# Patient Record
Sex: MALE | Race: WHITE | NOT HISPANIC OR LATINO | URBAN - NONMETROPOLITAN AREA
[De-identification: names, ages, dates, MRNs, and addresses within clinical notes are randomized per-mention and may not be internally consistent; named-entity substitution may affect disease eponyms.]

---

## 2021-03-23 ENCOUNTER — OFFICE VISIT (OUTPATIENT)
Dept: URBAN - NONMETROPOLITAN AREA CLINIC 2 | Facility: CLINIC | Age: 76
End: 2021-03-23
Payer: MEDICARE

## 2021-03-23 VITALS
WEIGHT: 235 LBS | BODY MASS INDEX: 32.9 KG/M2 | HEIGHT: 71 IN | SYSTOLIC BLOOD PRESSURE: 148 MMHG | DIASTOLIC BLOOD PRESSURE: 74 MMHG | HEART RATE: 45 BPM

## 2021-03-23 DIAGNOSIS — K92.1 MELENA: ICD-10-CM

## 2021-03-23 DIAGNOSIS — K57.90 DIVERTICULOSIS: ICD-10-CM

## 2021-03-23 DIAGNOSIS — Z86.010 PERSONAL HISTORY OF COLONIC POLYPS: ICD-10-CM

## 2021-03-23 DIAGNOSIS — R10.13 DYSPEPSIA: ICD-10-CM

## 2021-03-23 PROCEDURE — 99204 OFFICE O/P NEW MOD 45 MIN: CPT | Performed by: INTERNAL MEDICINE

## 2021-03-23 RX ORDER — TAMSULOSIN HYDROCHLORIDE 0.4 MG/1
1 CAPSULE CAPSULE ORAL ONCE A DAY
Status: ACTIVE | COMMUNITY

## 2021-03-23 RX ORDER — SODIUM, POTASSIUM,MAG SULFATES 17.5-3.13G
354 ML SOLUTION, RECONSTITUTED, ORAL ORAL ONCE
Qty: 354 MILLILITER | Refills: 0 | OUTPATIENT
Start: 2021-03-23 | End: 2021-03-24

## 2021-03-23 RX ORDER — MONTELUKAST SODIUM 10 MG/1
1 TABLET TABLET, FILM COATED ORAL ONCE A DAY
Status: ACTIVE | COMMUNITY

## 2021-03-23 RX ORDER — NAPROXEN 500 MG/1
1 TABLET WITH FOOD OR MILK AS NEEDED TABLET ORAL
Status: ACTIVE | COMMUNITY

## 2021-03-23 RX ORDER — GABAPENTIN 300 MG/1
1 CAPSULE CAPSULE ORAL ONCE A DAY
Status: ACTIVE | COMMUNITY

## 2021-03-23 RX ORDER — AMLODIPINE BESYLATE 10 MG/1
1 TABLET TABLET ORAL ONCE A DAY
Status: ACTIVE | COMMUNITY

## 2021-03-23 RX ORDER — ROSUVASTATIN CALCIUM 5 MG/1
1 TABLET TABLET, FILM COATED ORAL ONCE A DAY
Status: ACTIVE | COMMUNITY

## 2021-03-23 RX ORDER — LATANOPROST 50 UG/ML
1 DROP INTO AFFECTED EYE IN THE EVENING SOLUTION/ DROPS OPHTHALMIC ONCE A DAY
Status: ACTIVE | COMMUNITY

## 2021-03-23 RX ORDER — LOSARTAN POTASSIUM 25 MG/1
1 TABLET TABLET ORAL ONCE A DAY
Status: ACTIVE | COMMUNITY

## 2021-03-23 NOTE — HPI-TODAY'S VISIT:
75 year old gentleman with history of vocal cord cancer, colon polyps, hypertension, arthritis, former tobacco use (quit in 2004). diverticulosis presenting for evaluation of adenoma surveillance.   Mr. Fritz notes that his last colonoscopy was in 2014. He was noted to have 8 polyps during his first colonoscopy,   5 polyps during his second colonoscopy, and 3 polyps during his colonosocpyin 2014.  He experiences constipation at times. He notes indigestion. At times he can have dark stools. He does use naproxen 500 mg PO BID. He is not currently on a PPI.  Denies nausea, vomiting, abdominal pain.  He was previously an  for [employer].

## 2021-03-23 NOTE — PHYSICAL EXAM GASTROINTESTINAL
Abdomen , Central adiposity appreciated, soft , nontender, nondistended , no guarding or rigidity , no masses palpable , normal bowel sounds , Liver and Spleen , no hepatomegaly present , no hepatosplenomegaly , liver nontender , spleen not palpable

## 2021-03-28 LAB
A/G RATIO: 2
ALBUMIN: 5.1
ALKALINE PHOSPHATASE: 69
ALT (SGPT): 15
AST (SGOT): 23
BASO (ABSOLUTE): 0.1
BASOS: 1
BILIRUBIN, TOTAL: 0.7
BUN/CREATININE RATIO: 23
BUN: 24
C-REACTIVE PROTEIN, QUANT: 2
CALCIUM: 9.7
CARBON DIOXIDE, TOTAL: 30
CHLORIDE: 99
CREATININE: 1.03
EGFR IF AFRICN AM: 83
EGFR IF NONAFRICN AM: 72
ENDOMYSIAL ANTIBODY IGA: NEGATIVE
EOS (ABSOLUTE): 0.2
EOS: 3
GLOBULIN, TOTAL: 2.5
GLUCOSE: 93
H PYLORI, IGM ABS: <9
H. PYLORI, IGA ABS: <9
H. PYLORI, IGG ABS: 0.15
HEMATOCRIT: 45.9
HEMATOLOGY COMMENTS:: (no result)
HEMOGLOBIN: 14.8
IMMATURE CELLS: (no result)
IMMATURE GRANS (ABS): 0
IMMATURE GRANULOCYTES: 0
IMMUNOGLOBULIN A, QN, SERUM: 239
LYMPHS (ABSOLUTE): 2.1
LYMPHS: 35
MCH: 28.9
MCHC: 32.2
MCV: 90
MONOCYTES(ABSOLUTE): 0.4
MONOCYTES: 6
NEUTROPHILS (ABSOLUTE): 3.3
NEUTROPHILS: 55
NRBC: (no result)
PLATELETS: 155
POTASSIUM: 4.4
PROTEIN, TOTAL: 7.6
RBC: 5.12
RDW: 14
SODIUM: 142
T-TRANSGLUTAMINASE (TTG) IGA: <2
TSH: 1.76
WBC: 6

## 2021-04-20 ENCOUNTER — OFFICE VISIT (OUTPATIENT)
Dept: URBAN - METROPOLITAN AREA MEDICAL CENTER 1 | Facility: MEDICAL CENTER | Age: 76
End: 2021-04-20
Payer: MEDICARE

## 2021-04-20 DIAGNOSIS — Z86.010 H/O ADENOMATOUS POLYP OF COLON: ICD-10-CM

## 2021-04-20 DIAGNOSIS — K29.30 CHRONIC SUPERFICIAL GASTRITIS: ICD-10-CM

## 2021-04-20 DIAGNOSIS — K29.80 ACUTE DUODENITIS: ICD-10-CM

## 2021-04-20 DIAGNOSIS — K22.8 COLUMNAR-LINED ESOPHAGUS: ICD-10-CM

## 2021-04-20 DIAGNOSIS — K63.89 BACTERIAL OVERGROWTH SYNDROME: ICD-10-CM

## 2021-04-20 PROCEDURE — 43239 EGD BIOPSY SINGLE/MULTIPLE: CPT | Performed by: INTERNAL MEDICINE

## 2021-04-20 PROCEDURE — 45380 COLONOSCOPY AND BIOPSY: CPT | Performed by: INTERNAL MEDICINE

## 2021-04-20 RX ORDER — GABAPENTIN 300 MG/1
1 CAPSULE CAPSULE ORAL ONCE A DAY
Status: ACTIVE | COMMUNITY

## 2021-04-20 RX ORDER — TAMSULOSIN HYDROCHLORIDE 0.4 MG/1
1 CAPSULE CAPSULE ORAL ONCE A DAY
Status: ACTIVE | COMMUNITY

## 2021-04-20 RX ORDER — ROSUVASTATIN CALCIUM 5 MG/1
1 TABLET TABLET, FILM COATED ORAL ONCE A DAY
Status: ACTIVE | COMMUNITY

## 2021-04-20 RX ORDER — NAPROXEN 500 MG/1
1 TABLET WITH FOOD OR MILK AS NEEDED TABLET ORAL
Status: ACTIVE | COMMUNITY

## 2021-04-20 RX ORDER — LATANOPROST 50 UG/ML
1 DROP INTO AFFECTED EYE IN THE EVENING SOLUTION/ DROPS OPHTHALMIC ONCE A DAY
Status: ACTIVE | COMMUNITY

## 2021-04-20 RX ORDER — AMLODIPINE BESYLATE 10 MG/1
1 TABLET TABLET ORAL ONCE A DAY
Status: ACTIVE | COMMUNITY

## 2021-04-20 RX ORDER — LOSARTAN POTASSIUM 25 MG/1
1 TABLET TABLET ORAL ONCE A DAY
Status: ACTIVE | COMMUNITY

## 2021-04-20 RX ORDER — MONTELUKAST SODIUM 10 MG/1
1 TABLET TABLET, FILM COATED ORAL ONCE A DAY
Status: ACTIVE | COMMUNITY

## 2021-05-28 ENCOUNTER — OFFICE VISIT (OUTPATIENT)
Dept: URBAN - NONMETROPOLITAN AREA CLINIC 2 | Facility: CLINIC | Age: 76
End: 2021-05-28

## 2021-06-14 ENCOUNTER — OFFICE VISIT (OUTPATIENT)
Dept: URBAN - NONMETROPOLITAN AREA CLINIC 2 | Facility: CLINIC | Age: 76
End: 2021-06-14
Payer: MEDICARE

## 2021-06-14 ENCOUNTER — WEB ENCOUNTER (OUTPATIENT)
Dept: URBAN - NONMETROPOLITAN AREA CLINIC 2 | Facility: CLINIC | Age: 76
End: 2021-06-14

## 2021-06-14 DIAGNOSIS — R10.13 DYSPEPSIA: ICD-10-CM

## 2021-06-14 DIAGNOSIS — K92.1 MELENA: ICD-10-CM

## 2021-06-14 DIAGNOSIS — K57.90 DIVERTICULOSIS: ICD-10-CM

## 2021-06-14 DIAGNOSIS — Z86.010 PERSONAL HISTORY OF COLONIC POLYPS: ICD-10-CM

## 2021-06-14 DIAGNOSIS — K26.9 DUODENAL ULCER: ICD-10-CM

## 2021-06-14 PROCEDURE — 99214 OFFICE O/P EST MOD 30 MIN: CPT | Performed by: INTERNAL MEDICINE

## 2021-06-14 RX ORDER — MONTELUKAST SODIUM 10 MG/1
1 TABLET TABLET, FILM COATED ORAL ONCE A DAY
Status: ACTIVE | COMMUNITY

## 2021-06-14 RX ORDER — NAPROXEN 500 MG/1
1 TABLET WITH FOOD OR MILK AS NEEDED TABLET ORAL
Status: DISCONTINUED | COMMUNITY

## 2021-06-14 RX ORDER — LOSARTAN POTASSIUM 25 MG/1
1 TABLET TABLET ORAL ONCE A DAY
Status: ACTIVE | COMMUNITY

## 2021-06-14 RX ORDER — GABAPENTIN 300 MG/1
1 CAPSULE CAPSULE ORAL ONCE A DAY
Status: ACTIVE | COMMUNITY

## 2021-06-14 RX ORDER — TAMSULOSIN HYDROCHLORIDE 0.4 MG/1
1 CAPSULE CAPSULE ORAL ONCE A DAY
Status: ACTIVE | COMMUNITY

## 2021-06-14 RX ORDER — LATANOPROST 50 UG/ML
1 DROP INTO AFFECTED EYE IN THE EVENING SOLUTION/ DROPS OPHTHALMIC ONCE A DAY
Status: ACTIVE | COMMUNITY

## 2021-06-14 RX ORDER — AMLODIPINE BESYLATE 10 MG/1
1 TABLET TABLET ORAL ONCE A DAY
Status: ACTIVE | COMMUNITY

## 2021-06-14 RX ORDER — ROSUVASTATIN CALCIUM 5 MG/1
1 TABLET TABLET, FILM COATED ORAL ONCE A DAY
Status: ACTIVE | COMMUNITY

## 2021-06-14 NOTE — HPI-TODAY'S VISIT:
3/23/2021: Initial Gastroenterology Clinic Visit    75 year old gentleman with history of vocal cord cancer, colon polyps, hypertension, arthritis, former tobacco use (quit in 2004), diverticulosis presenting for evaluation of adenoma surveillance.   Mr. Fritz notes that his last colonoscopy was in 2014. He was noted to have 8 polyps during his first colonoscopy, 5 polyps during his second colonoscopy, and 3 polyps during his colonosocpy in 2014.  He experiences constipation at times. He notes indigestion. At times he can have dark stools. He does use naproxen 500 mg PO BID. He is not currently on a PPI.  Denies nausea, vomiting, abdominal pain.  He was previously an  for [employer].  3/23/2021: CBC, chemistry panel, LFTs normal. TSH normal. Celiac serologies negative for Celiac disease. Helicobacter pylori serologies negative. CRP normal.  4/20/2021: EGD FINDINGS / IMPRESSION: - There was erythematous mucosa 20 cm from the incisors consistent with inlet patch. Biopsies obtained with cold biopsy forceps. Estimated blood loss was minimal. - The Z-line was regular at 40 cm from the incisors. - There was erythematous mucosa in the antrum and body of the stomach. Biopsies obtained with cold biopsy forceps to rule out Helicobacter pylori. Estimated blood loss was minimal.  - The cardia and fundus were normal on retroflexion. - There were three superficial duodenal ulcers in the duodenal bulb. Biopsies obtained with cold biopsy forceps. Estimated blood loss was minimal.  - The rest of the examined duodenum was normal.   4/20/2021: Pathology from EGD A.  SMALL INTESTINE/DUODENUM (BIOPSY):   ACUTE AND CHRONIC DUODENITIS WITH MILD VILLOUS ATROPHY, NONSPECIFIC (NEGATIVE FOR ULCERATION AND EPITHELIAL ATYPIA). B.  STOMACH, ANTRUM (BIOPSY):   FOCAL MILD CHRONIC GASTRITIS, NONSPECIFIC, NOT OTHERWISE REMARKABLE (NEGATIVE FOR ULCERATION, ACUTE INFLAMMATION, AND EPITHELIAL ATYPIA); IMMUNOSTAIN NEGATIVE FOR HELICOBACTER PYLORI MICROORGANISMS. C.  ESOPHAGUS (BIOPSY):   SCANT SQUAMOUS MUCOSA (NEGATIVE FOR ATYPIA AND INFLAMMATION) AND PREDOMINANTLY GASTRIC-TYPE GLANDULAR MUCOSA WITH MILD TO MODERATE STROMAL CHRONIC INFLAMMATION (NEGATIVE FOR DYSPLASIA AND FELDER'S-TYPE GOBLET CELL INTESTINAL METAPLASIA).  4/20/2021: Colonoscopy  FINDINGS / IMPRESSION:  - The perianal and digital rectal examinations were normal. - There was severe diverticulosis in the descending colon and sigmoid colon. The colonoscopy was technically difficult due to restricted mobility in the setting of severe diverticulosis. Advancement of the colonoscope to the cecum was successful with slow maneuvers through the descending colon and sigmoid colon as well as sigmoid pressure. - The terminal ileum was normal. - There was one 3 mm polyp in the ascending colon. Polypectomy performed with cold biopsy forceps. Estimated blood loss was minimal.  - Severe diverticulosis in the sigmoid colon and descending colon as noted above.  - Retroflexion in the rectum was normal.   4/20/2021: Pathology from Colonoscopy  D.  POLYP, ASCENDING COLON (BIOPSY):   MUCOSAL FOLD (POLYPOID MUCOSAL NODULE) - SEE COMMENT.  6/14/2021: Gastroenterology Clinic Follow-Up Mr. Fritz denies abdominal pain, nausea, vomiting, diarrhea. He has stopped using naproxen given prior episodes of epigastric discomfort as well as melena. He noted that after stopping the naproxen, his bowel movements returned to being brown. He is currently on BID PPI.

## 2021-06-16 LAB
H PYLORI, IGM ABS: <9
H. PYLORI, IGA ABS: <9
H. PYLORI, IGG ABS: 0.12

## 2021-09-21 ENCOUNTER — OFFICE VISIT (OUTPATIENT)
Dept: URBAN - METROPOLITAN AREA MEDICAL CENTER 1 | Facility: MEDICAL CENTER | Age: 76
End: 2021-09-21
Payer: MEDICARE

## 2021-09-21 DIAGNOSIS — K29.30 CHRONIC SUPERFICIAL GASTRITIS: ICD-10-CM

## 2021-09-21 DIAGNOSIS — K31.89 ACQUIRED DEFORMITY OF DUODENUM: ICD-10-CM

## 2021-09-21 DIAGNOSIS — K22.8 COLUMNAR-LINED ESOPHAGUS: ICD-10-CM

## 2021-09-21 PROCEDURE — 43239 EGD BIOPSY SINGLE/MULTIPLE: CPT | Performed by: INTERNAL MEDICINE

## 2021-09-21 RX ORDER — LOSARTAN POTASSIUM 25 MG/1
1 TABLET TABLET ORAL ONCE A DAY
Status: ACTIVE | COMMUNITY

## 2021-09-21 RX ORDER — ROSUVASTATIN CALCIUM 5 MG/1
1 TABLET TABLET, FILM COATED ORAL ONCE A DAY
Status: ACTIVE | COMMUNITY

## 2021-09-21 RX ORDER — AMLODIPINE BESYLATE 10 MG/1
1 TABLET TABLET ORAL ONCE A DAY
Status: ACTIVE | COMMUNITY

## 2021-09-21 RX ORDER — MONTELUKAST SODIUM 10 MG/1
1 TABLET TABLET, FILM COATED ORAL ONCE A DAY
Status: ACTIVE | COMMUNITY

## 2021-09-21 RX ORDER — GABAPENTIN 300 MG/1
1 CAPSULE CAPSULE ORAL ONCE A DAY
Status: ACTIVE | COMMUNITY

## 2021-09-21 RX ORDER — TAMSULOSIN HYDROCHLORIDE 0.4 MG/1
1 CAPSULE CAPSULE ORAL ONCE A DAY
Status: ACTIVE | COMMUNITY

## 2021-09-21 RX ORDER — LATANOPROST 50 UG/ML
1 DROP INTO AFFECTED EYE IN THE EVENING SOLUTION/ DROPS OPHTHALMIC ONCE A DAY
Status: ACTIVE | COMMUNITY

## 2021-10-11 ENCOUNTER — OFFICE VISIT (OUTPATIENT)
Dept: URBAN - NONMETROPOLITAN AREA CLINIC 2 | Facility: CLINIC | Age: 76
End: 2021-10-11

## 2021-10-22 ENCOUNTER — OFFICE VISIT (OUTPATIENT)
Dept: URBAN - NONMETROPOLITAN AREA CLINIC 2 | Facility: CLINIC | Age: 76
End: 2021-10-22
Payer: MEDICARE

## 2021-10-22 VITALS
SYSTOLIC BLOOD PRESSURE: 152 MMHG | DIASTOLIC BLOOD PRESSURE: 63 MMHG | HEART RATE: 48 BPM | WEIGHT: 240 LBS | BODY MASS INDEX: 33.6 KG/M2 | HEIGHT: 71 IN | TEMPERATURE: 97 F

## 2021-10-22 DIAGNOSIS — K92.1 MELENA: ICD-10-CM

## 2021-10-22 DIAGNOSIS — K57.90 DIVERTICULOSIS: ICD-10-CM

## 2021-10-22 DIAGNOSIS — K26.9 DUODENAL ULCER: ICD-10-CM

## 2021-10-22 DIAGNOSIS — Z86.010 PERSONAL HISTORY OF COLONIC POLYPS: ICD-10-CM

## 2021-10-22 DIAGNOSIS — R10.13 DYSPEPSIA: ICD-10-CM

## 2021-10-22 PROCEDURE — 99213 OFFICE O/P EST LOW 20 MIN: CPT | Performed by: INTERNAL MEDICINE

## 2021-10-22 RX ORDER — ROSUVASTATIN CALCIUM 5 MG/1
1 TABLET TABLET, FILM COATED ORAL ONCE A DAY
Status: ACTIVE | COMMUNITY

## 2021-10-22 RX ORDER — AMLODIPINE BESYLATE 10 MG/1
1 TABLET TABLET ORAL ONCE A DAY
Status: ACTIVE | COMMUNITY

## 2021-10-22 RX ORDER — MONTELUKAST SODIUM 10 MG/1
1 TABLET TABLET, FILM COATED ORAL ONCE A DAY
Status: ACTIVE | COMMUNITY

## 2021-10-22 RX ORDER — LOSARTAN POTASSIUM 25 MG/1
1 TABLET TABLET ORAL ONCE A DAY
Status: ACTIVE | COMMUNITY

## 2021-10-22 RX ORDER — TAMSULOSIN HYDROCHLORIDE 0.4 MG/1
1 CAPSULE CAPSULE ORAL ONCE A DAY
Status: ACTIVE | COMMUNITY

## 2021-10-22 RX ORDER — LATANOPROST 50 UG/ML
1 DROP INTO AFFECTED EYE IN THE EVENING SOLUTION/ DROPS OPHTHALMIC ONCE A DAY
Status: ACTIVE | COMMUNITY

## 2021-10-22 RX ORDER — GABAPENTIN 300 MG/1
1 CAPSULE CAPSULE ORAL ONCE A DAY
Status: ACTIVE | COMMUNITY

## 2021-10-22 NOTE — HPI-TODAY'S VISIT:
3/23/2021: Initial Gastroenterology Clinic Visit      75 year old gentleman with history of vocal cord cancer, colon polyps, hypertension, arthritis, former tobacco use (quit in 2004), diverticulosis presenting for evaluation of adenoma surveillance.   Mr. Fritz notes that his last colonoscopy was in 2014. He was noted to have 8 polyps during his first colonoscopy, 5 polyps during his second colonoscopy, and 3 polyps during his colonosocpy in 2014.  He experiences constipation at times. He notes indigestion. At times he can have dark stools. He does use naproxen 500 mg PO BID. He is not currently on a PPI.  Denies nausea, vomiting, abdominal pain.  He was previously an  for [employer].  3/23/2021: CBC, chemistry panel, LFTs normal. TSH normal. Celiac serologies negative for Celiac disease. Helicobacter pylori serologies negative. CRP normal.  4/20/2021: EGD FINDINGS / IMPRESSION: - There was erythematous mucosa 20 cm from the incisors consistent with inlet patch. Biopsies obtained with cold biopsy forceps. Estimated blood loss was minimal. - The Z-line was regular at 40 cm from the incisors. - There was erythematous mucosa in the antrum and body of the stomach. Biopsies obtained with cold biopsy forceps to rule out Helicobacter pylori. Estimated blood loss was minimal.  - The cardia and fundus were normal on retroflexion. - There were three superficial duodenal ulcers in the duodenal bulb. Biopsies obtained with cold biopsy forceps. Estimated blood loss was minimal.  - The rest of the examined duodenum was normal.   4/20/2021: Pathology from EGD A.  SMALL INTESTINE/DUODENUM (BIOPSY):   ACUTE AND CHRONIC DUODENITIS WITH MILD VILLOUS ATROPHY, NONSPECIFIC (NEGATIVE FOR ULCERATION AND EPITHELIAL ATYPIA). B.  STOMACH, ANTRUM (BIOPSY):   FOCAL MILD CHRONIC GASTRITIS, NONSPECIFIC, NOT OTHERWISE REMARKABLE (NEGATIVE FOR ULCERATION, ACUTE INFLAMMATION, AND EPITHELIAL ATYPIA); IMMUNOSTAIN NEGATIVE FOR HELICOBACTER PYLORI MICROORGANISMS. C.  ESOPHAGUS (BIOPSY):   SCANT SQUAMOUS MUCOSA (NEGATIVE FOR ATYPIA AND INFLAMMATION) AND PREDOMINANTLY GASTRIC-TYPE GLANDULAR MUCOSA WITH MILD TO MODERATE STROMAL CHRONIC INFLAMMATION (NEGATIVE FOR DYSPLASIA AND FELDER'S-TYPE GOBLET CELL INTESTINAL METAPLASIA).  4/20/2021: Colonoscopy  FINDINGS / IMPRESSION:  - The perianal and digital rectal examinations were normal. - There was severe diverticulosis in the descending colon and sigmoid colon. The colonoscopy was technically difficult due to restricted mobility in the setting of severe diverticulosis. Advancement of the colonoscope to the cecum was successful with slow maneuvers through the descending colon and sigmoid colon as well as sigmoid pressure. - The terminal ileum was normal. - There was one 3 mm polyp in the ascending colon. Polypectomy performed with cold biopsy forceps. Estimated blood loss was minimal.  - Severe diverticulosis in the sigmoid colon and descending colon as noted above.  - Retroflexion in the rectum was normal.   4/20/2021: Pathology from Colonoscopy  D.  POLYP, ASCENDING COLON (BIOPSY):   MUCOSAL FOLD (POLYPOID MUCOSAL NODULE).  6/14/2021: Gastroenterology Clinic Follow-Up Mr. Fritz denies abdominal pain, nausea, vomiting, diarrhea. He has stopped using naproxen given prior episodes of epigastric discomfort as well as melena. He noted that after stopping the naproxen, his bowel movements returned to being brown. He is currently on BID PPI.  9/21/2021: EGD Findings: - Localized mucosal changes were found in the upper third of the esophagus consistent with inlet patch. This was previously biopsied. - The exam of the esophagus was otherwise normal. - The Z-line was regular and was found 40 cm from the incisors. - Erythematous mucosa without bleeding was found in the gastric antrum. Biopsies were taken with a cold forceps for histology. Estimated blood loss was minimal. - The cardia and gastric fundus were normal on retroflexion. - Two erosions without bleeding were found in the duodenal bulb. Biopsies were taken with a cold forceps for histology. Estimated blood loss was minimal. - There is no endoscopic evidence of ulceration in the entire examined duodenum.  9/21/2021: Pathology from EGD Final Diagnosis A.  DUODENUM BIOPSY:         MILD TO MODERATE MIXED INFLAMMATORY FEATURES.         NO CARCINOMA. B.  STOMACH, BIOPSY:              MILD CHRONIC GASTRITIS.              IMMUNOPEROXIDASE  STAIN IS NEGATIVE FOR HELICOBACTER.              NO CARCINOMA SEEN.    10/22/2021: Gastroenterology Follow-Up Visit Mr. Fritz has not been using naproxen. He is using his proton pump inhibitor as recommended. Since stopping the naproxen and initiating the proton pump inhibitor, Mr. Fritz has had complete resolution of his abdominal symptoms. He has not had melena or hematochezia. He has one brown bowel movement per day.

## 2022-04-04 ENCOUNTER — OFFICE VISIT (OUTPATIENT)
Dept: URBAN - NONMETROPOLITAN AREA CLINIC 2 | Facility: CLINIC | Age: 77
End: 2022-04-04

## 2022-04-19 ENCOUNTER — WEB ENCOUNTER (OUTPATIENT)
Dept: URBAN - NONMETROPOLITAN AREA CLINIC 2 | Facility: CLINIC | Age: 77
End: 2022-04-19

## 2022-04-20 ENCOUNTER — OFFICE VISIT (OUTPATIENT)
Dept: URBAN - NONMETROPOLITAN AREA CLINIC 13 | Facility: CLINIC | Age: 77
End: 2022-04-20

## 2022-04-25 ENCOUNTER — OFFICE VISIT (OUTPATIENT)
Dept: URBAN - NONMETROPOLITAN AREA CLINIC 2 | Facility: CLINIC | Age: 77
End: 2022-04-25
Payer: MEDICARE

## 2022-04-25 VITALS
BODY MASS INDEX: 34.3 KG/M2 | HEIGHT: 71 IN | WEIGHT: 245 LBS | DIASTOLIC BLOOD PRESSURE: 73 MMHG | HEART RATE: 47 BPM | SYSTOLIC BLOOD PRESSURE: 164 MMHG

## 2022-04-25 DIAGNOSIS — K57.90 DIVERTICULOSIS: ICD-10-CM

## 2022-04-25 DIAGNOSIS — Z86.010 PERSONAL HISTORY OF COLONIC POLYPS: ICD-10-CM

## 2022-04-25 DIAGNOSIS — K26.9 DUODENAL ULCER: ICD-10-CM

## 2022-04-25 DIAGNOSIS — R10.13 DYSPEPSIA: ICD-10-CM

## 2022-04-25 DIAGNOSIS — K92.1 MELENA: ICD-10-CM

## 2022-04-25 PROCEDURE — 99213 OFFICE O/P EST LOW 20 MIN: CPT | Performed by: INTERNAL MEDICINE

## 2022-04-25 RX ORDER — ROSUVASTATIN CALCIUM 5 MG/1
1 TABLET TABLET, FILM COATED ORAL ONCE A DAY
Status: ACTIVE | COMMUNITY

## 2022-04-25 RX ORDER — MONTELUKAST SODIUM 10 MG/1
1 TABLET TABLET, FILM COATED ORAL ONCE A DAY
Status: ACTIVE | COMMUNITY

## 2022-04-25 RX ORDER — HYDROCHLOROTHIAZIDE 25 MG/1
TABLET ORAL
Qty: 90 | Status: ACTIVE | COMMUNITY

## 2022-04-25 RX ORDER — FLUTICASONE PROPIONATE 50 UG/1
SPRAY, METERED NASAL
Qty: 16 | Status: ACTIVE | COMMUNITY

## 2022-04-25 RX ORDER — AMLODIPINE BESYLATE 10 MG/1
1 TABLET TABLET ORAL ONCE A DAY
Status: ACTIVE | COMMUNITY

## 2022-04-25 RX ORDER — TAMSULOSIN HYDROCHLORIDE 0.4 MG/1
1 CAPSULE CAPSULE ORAL ONCE A DAY
Status: ACTIVE | COMMUNITY

## 2022-04-25 RX ORDER — GABAPENTIN 300 MG/1
1 CAPSULE CAPSULE ORAL ONCE A DAY
Status: ACTIVE | COMMUNITY

## 2022-04-25 RX ORDER — LATANOPROST 50 UG/ML
1 DROP INTO AFFECTED EYE IN THE EVENING SOLUTION/ DROPS OPHTHALMIC ONCE A DAY
Status: ACTIVE | COMMUNITY

## 2022-04-25 RX ORDER — LOSARTAN POTASSIUM 25 MG/1
1 TABLET TABLET ORAL ONCE A DAY
Status: ACTIVE | COMMUNITY

## 2022-04-25 NOTE — HPI-TODAY'S VISIT:
3/23/2021: Initial Gastroenterology Clinic Visit         75 year old gentleman with history of vocal cord cancer, colon polyps, hypertension, arthritis, former tobacco use (quit in 2004), diverticulosis presenting for evaluation of adenoma surveillance.   Mr. Fritz notes that his last colonoscopy was in 2014. He was noted to have 8 polyps during his first colonoscopy, 5 polyps during his second colonoscopy, and 3 polyps during his colonosocpy in 2014.  He experiences constipation at times. He notes indigestion. At times he can have dark stools. He does use naproxen 500 mg PO BID. He is not currently on a PPI.  Denies nausea, vomiting, abdominal pain.  He was previously an  for [employer].  3/23/2021: CBC, chemistry panel, LFTs normal. TSH normal. Celiac serologies negative for Celiac disease. Helicobacter pylori serologies negative. CRP normal.  4/20/2021: EGD FINDINGS / IMPRESSION: - There was erythematous mucosa 20 cm from the incisors consistent with inlet patch. Biopsies obtained with cold biopsy forceps. Estimated blood loss was minimal. - The Z-line was regular at 40 cm from the incisors. - There was erythematous mucosa in the antrum and body of the stomach. Biopsies obtained with cold biopsy forceps to rule out Helicobacter pylori. Estimated blood loss was minimal.  - The cardia and fundus were normal on retroflexion. - There were three superficial duodenal ulcers in the duodenal bulb. Biopsies obtained with cold biopsy forceps. Estimated blood loss was minimal.  - The rest of the examined duodenum was normal.   4/20/2021: Pathology from EGD A.  SMALL INTESTINE/DUODENUM (BIOPSY):   ACUTE AND CHRONIC DUODENITIS WITH MILD VILLOUS ATROPHY, NONSPECIFIC (NEGATIVE FOR ULCERATION AND EPITHELIAL ATYPIA). B.  STOMACH, ANTRUM (BIOPSY):   FOCAL MILD CHRONIC GASTRITIS, NONSPECIFIC, NOT OTHERWISE REMARKABLE (NEGATIVE FOR ULCERATION, ACUTE INFLAMMATION, AND EPITHELIAL ATYPIA); IMMUNOSTAIN NEGATIVE FOR HELICOBACTER PYLORI MICROORGANISMS. C.  ESOPHAGUS (BIOPSY):   SCANT SQUAMOUS MUCOSA (NEGATIVE FOR ATYPIA AND INFLAMMATION) AND PREDOMINANTLY GASTRIC-TYPE GLANDULAR MUCOSA WITH MILD TO MODERATE STROMAL CHRONIC INFLAMMATION (NEGATIVE FOR DYSPLASIA AND FELDER'S-TYPE GOBLET CELL INTESTINAL METAPLASIA).  4/20/2021: Colonoscopy  FINDINGS / IMPRESSION:  - The perianal and digital rectal examinations were normal. - There was severe diverticulosis in the descending colon and sigmoid colon. The colonoscopy was technically difficult due to restricted mobility in the setting of severe diverticulosis. Advancement of the colonoscope to the cecum was successful with slow maneuvers through the descending colon and sigmoid colon as well as sigmoid pressure. - The terminal ileum was normal. - There was one 3 mm polyp in the ascending colon. Polypectomy performed with cold biopsy forceps. Estimated blood loss was minimal.  - Severe diverticulosis in the sigmoid colon and descending colon as noted above.  - Retroflexion in the rectum was normal.   4/20/2021: Pathology from Colonoscopy  D.  POLYP, ASCENDING COLON (BIOPSY):   MUCOSAL FOLD (POLYPOID MUCOSAL NODULE).  6/14/2021: Gastroenterology Clinic Follow-Up Mr. Fritz denies abdominal pain, nausea, vomiting, diarrhea. He has stopped using naproxen given prior episodes of epigastric discomfort as well as melena. He noted that after stopping the naproxen, his bowel movements returned to being brown. He is currently on BID PPI.  6/14/20221: Helicobacter pylori serologies negative for Helicobacter pylori.   9/21/2021: EGD Findings: - Localized mucosal changes were found in the upper third of the esophagus consistent with inlet patch. This was previously biopsied. - The exam of the esophagus was otherwise normal. - The Z-line was regular and was found 40 cm from the incisors. - Erythematous mucosa without bleeding was found in the gastric antrum. Biopsies were taken with a cold forceps for histology. Estimated blood loss was minimal. - The cardia and gastric fundus were normal on retroflexion. - Two erosions without bleeding were found in the duodenal bulb. Biopsies were taken with a cold forceps for histology. Estimated blood loss was minimal. - There is no endoscopic evidence of ulceration in the entire examined duodenum.  9/21/2021: Pathology from EGD Final Diagnosis A.  DUODENUM BIOPSY:         MILD TO MODERATE MIXED INFLAMMATORY FEATURES.         NO CARCINOMA. B.  STOMACH, BIOPSY:              MILD CHRONIC GASTRITIS.              IMMUNOPEROXIDASE  STAIN IS NEGATIVE FOR HELICOBACTER.              NO CARCINOMA SEEN.    10/22/2021: Gastroenterology Follow-Up Visit Mr. Fritz has not been using naproxen. He is using his proton pump inhibitor as recommended. Since stopping the naproxen and initiating the proton pump inhibitor, Mr. Fritz has had complete resolution of his abdominal symptoms. He has not had melena or hematochezia. He has one brown bowel movement per day.  4/25/2022: Gastroenterology Follow-Up Visit  Denies abdominal pain, melena, hematochezia. Denies unintetional weight loss. He has not used naproxen. He is using PPI as needed.

## 2022-05-07 ENCOUNTER — DASHBOARD ENCOUNTERS (OUTPATIENT)
Age: 77
End: 2022-05-07

## 2022-05-07 PROBLEM — 162031009: Status: ACTIVE | Noted: 2021-03-23

## 2022-05-07 PROBLEM — 51868009: Status: ACTIVE | Noted: 2021-06-14

## 2022-05-07 PROBLEM — 428283002: Status: ACTIVE | Noted: 2021-03-23

## 2022-05-07 PROBLEM — 2901004: Status: ACTIVE | Noted: 2021-03-27

## 2022-05-07 PROBLEM — 397881000: Status: ACTIVE | Noted: 2021-03-27

## 2024-09-19 ENCOUNTER — LAB OUTSIDE AN ENCOUNTER (OUTPATIENT)
Dept: URBAN - NONMETROPOLITAN AREA CLINIC 2 | Facility: CLINIC | Age: 79
End: 2024-09-19

## 2024-09-19 ENCOUNTER — OFFICE VISIT (OUTPATIENT)
Dept: URBAN - NONMETROPOLITAN AREA CLINIC 2 | Facility: CLINIC | Age: 79
End: 2024-09-19

## 2024-09-19 VITALS
WEIGHT: 252 LBS | BODY MASS INDEX: 35.28 KG/M2 | DIASTOLIC BLOOD PRESSURE: 75 MMHG | SYSTOLIC BLOOD PRESSURE: 149 MMHG | HEIGHT: 71 IN | HEART RATE: 57 BPM

## 2024-09-19 PROBLEM — 249504006: Status: ACTIVE | Noted: 2024-09-19

## 2024-09-19 RX ORDER — FLUTICASONE PROPIONATE 50 UG/1
SPRAY, METERED NASAL
Qty: 16 | Status: ACTIVE | COMMUNITY

## 2024-09-19 RX ORDER — GABAPENTIN 300 MG/1
1 CAPSULE CAPSULE ORAL ONCE A DAY
Status: ACTIVE | COMMUNITY

## 2024-09-19 RX ORDER — ROSUVASTATIN 5 MG/1
1 TABLET TABLET, FILM COATED ORAL ONCE A DAY
Status: ACTIVE | COMMUNITY

## 2024-09-19 RX ORDER — FAMOTIDINE 40 MG/1
1 TABLET AT BEDTIME TABLET, FILM COATED ORAL ONCE A DAY
Qty: 90 TABLET | Refills: 3 | OUTPATIENT
Start: 2024-09-19

## 2024-09-19 RX ORDER — LOSARTAN POTASSIUM 25 MG/1
1 TABLET TABLET ORAL ONCE A DAY
Status: ACTIVE | COMMUNITY

## 2024-09-19 RX ORDER — TAMSULOSIN HYDROCHLORIDE 0.4 MG/1
1 CAPSULE CAPSULE ORAL ONCE A DAY
Status: ACTIVE | COMMUNITY

## 2024-09-19 RX ORDER — HYDROCHLOROTHIAZIDE 25 MG/1
TABLET ORAL
Qty: 90 | Status: ACTIVE | COMMUNITY

## 2024-09-19 RX ORDER — LATANOPROST 50 UG/ML
1 DROP INTO AFFECTED EYE IN THE EVENING SOLUTION/ DROPS OPHTHALMIC ONCE A DAY
Status: ACTIVE | COMMUNITY

## 2024-09-19 RX ORDER — AMLODIPINE BESYLATE 10 MG/1
1 TABLET TABLET ORAL ONCE A DAY
Status: ACTIVE | COMMUNITY

## 2024-09-19 RX ORDER — MONTELUKAST SODIUM 10 MG/1
1 TABLET TABLET, FILM COATED ORAL ONCE A DAY
Status: ACTIVE | COMMUNITY

## 2024-09-19 NOTE — HPI-TODAY'S VISIT:
3/23/2021: Initial Gastroenterology Clinic Visit         75 year old gentleman with history of vocal cord cancer, colon polyps, hypertension, arthritis, former tobacco use (quit in 2004), diverticulosis presenting for evaluation of adenoma surveillance.   Mr. Fritz notes that his last colonoscopy was in 2014. He was noted to have 8 polyps during his first colonoscopy, 5 polyps during his second colonoscopy, and 3 polyps during his colonosocpy in 2014.  He experiences constipation at times. He notes indigestion. At times he can have dark stools. He does use naproxen 500 mg PO BID. He is not currently on a PPI.  Denies nausea, vomiting, abdominal pain.  He was previously an  for [employer].  3/23/2021: CBC, chemistry panel, LFTs normal. TSH normal. Celiac serologies negative for Celiac disease. Helicobacter pylori serologies negative. CRP normal.  4/20/2021: EGD FINDINGS / IMPRESSION: - There was erythematous mucosa 20 cm from the incisors consistent with inlet patch. Biopsies obtained with cold biopsy forceps. Estimated blood loss was minimal. - The Z-line was regular at 40 cm from the incisors. - There was erythematous mucosa in the antrum and body of the stomach. Biopsies obtained with cold biopsy forceps to rule out Helicobacter pylori. Estimated blood loss was minimal.  - The cardia and fundus were normal on retroflexion. - There were three superficial duodenal ulcers in the duodenal bulb. Biopsies obtained with cold biopsy forceps. Estimated blood loss was minimal.  - The rest of the examined duodenum was normal.   4/20/2021: Pathology from EGD A.  SMALL INTESTINE/DUODENUM (BIOPSY):   ACUTE AND CHRONIC DUODENITIS WITH MILD VILLOUS ATROPHY, NONSPECIFIC (NEGATIVE FOR ULCERATION AND EPITHELIAL ATYPIA). B.  STOMACH, ANTRUM (BIOPSY):   FOCAL MILD CHRONIC GASTRITIS, NONSPECIFIC, NOT OTHERWISE REMARKABLE (NEGATIVE FOR ULCERATION, ACUTE INFLAMMATION, AND EPITHELIAL ATYPIA); IMMUNOSTAIN NEGATIVE FOR HELICOBACTER PYLORI MICROORGANISMS. C.  ESOPHAGUS (BIOPSY):   SCANT SQUAMOUS MUCOSA (NEGATIVE FOR ATYPIA AND INFLAMMATION) AND PREDOMINANTLY GASTRIC-TYPE GLANDULAR MUCOSA WITH MILD TO MODERATE STROMAL CHRONIC INFLAMMATION (NEGATIVE FOR DYSPLASIA AND FELDER'S-TYPE GOBLET CELL INTESTINAL METAPLASIA).  4/20/2021: Colonoscopy  FINDINGS / IMPRESSION:  - The perianal and digital rectal examinations were normal. - There was severe diverticulosis in the descending colon and sigmoid colon. The colonoscopy was technically difficult due to restricted mobility in the setting of severe diverticulosis. Advancement of the colonoscope to the cecum was successful with slow maneuvers through the descending colon and sigmoid colon as well as sigmoid pressure. - The terminal ileum was normal. - There was one 3 mm polyp in the ascending colon. Polypectomy performed with cold biopsy forceps. Estimated blood loss was minimal.  - Severe diverticulosis in the sigmoid colon and descending colon as noted above.  - Retroflexion in the rectum was normal.   4/20/2021: Pathology from Colonoscopy  D.  POLYP, ASCENDING COLON (BIOPSY):   MUCOSAL FOLD (POLYPOID MUCOSAL NODULE).  6/14/2021: Gastroenterology Clinic Follow-Up Mr. Fritz denies abdominal pain, nausea, vomiting, diarrhea. He has stopped using naproxen given prior episodes of epigastric discomfort as well as melena. He noted that after stopping the naproxen, his bowel movements returned to being brown. He is currently on BID PPI.  6/14/20221: Helicobacter pylori serologies negative for Helicobacter pylori.   9/21/2021: EGD Findings: - Localized mucosal changes were found in the upper third of the esophagus consistent with inlet patch. This was previously biopsied. - The exam of the esophagus was otherwise normal. - The Z-line was regular and was found 40 cm from the incisors. - Erythematous mucosa without bleeding was found in the gastric antrum. Biopsies were taken with a cold forceps for histology. Estimated blood loss was minimal. - The cardia and gastric fundus were normal on retroflexion. - Two erosions without bleeding were found in the duodenal bulb. Biopsies were taken with a cold forceps for histology. Estimated blood loss was minimal. - There is no endoscopic evidence of ulceration in the entire examined duodenum.  9/21/2021: Pathology from EGD Final Diagnosis A.  DUODENUM BIOPSY:         MILD TO MODERATE MIXED INFLAMMATORY FEATURES.         NO CARCINOMA. B.  STOMACH, BIOPSY:              MILD CHRONIC GASTRITIS.              IMMUNOPEROXIDASE  STAIN IS NEGATIVE FOR HELICOBACTER.              NO CARCINOMA SEEN.    10/22/2021: Gastroenterology Follow-Up Visit Mr. Fritz has not been using naproxen. He is using his proton pump inhibitor as recommended. Since stopping the naproxen and initiating the proton pump inhibitor, Mr. Fritz has had complete resolution of his abdominal symptoms. He has not had melena or hematochezia. He has one brown bowel movement per day.  4/25/2022: Gastroenterology Follow-Up Visit  Denies abdominal pain, melena, hematochezia. Denies unintetional weight loss. He has not used naproxen. He is using PPI as needed. 9/19/2024 Mr. Hong returns to clinic for follow-up previously managed by Dr. Rosenberg with a history of GERD maintained on PPI daily.  Since his last office visit 2 years ago he has been dealing with some increased belching and flatus.  Today he questions whether he needs a repeat upper endoscopy.  He has not tried Gas-X or any over-the-counter supplements or medications for this.  He has not tried any dietary changes.  He continues eating for prunes a day for regular bowel habits however reports looser stools since starting metformin daily.  He denies any other significant medical changes.  He is not having any abdominal pain, nausea, dysphagia.  He is not having a flare in GERD symptoms.  He does have a history of duodenal ulcers.  Today we discussed management of gas and digestive tract and we will schedule him for an EGD with biopsies with Dr. Salguero.  He will start famotidine 40 mg at bedtime and reduce artificial sweeteners in his diet.  He does add sweetened low to his morning coffee and uses Estrada's cough drops for seasonal allergies which have an artificial sweetener in them as well. Otherwise he avoids caffeine and he, carbonated beverages and does not drink through a straw.  He will also start Florastor.  He keeps a high-fiber diet.  He avoids NSAIDs.  He denies any blood in his stool, unintentional weight loss.  His past endoscopic procedures were completed at the hospital and he states he has significant sleep apnea. He has a trip to [place] for [employer] tour in February.  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

## 2025-03-13 ENCOUNTER — OFFICE VISIT (OUTPATIENT)
Dept: URBAN - METROPOLITAN AREA MEDICAL CENTER 1 | Facility: MEDICAL CENTER | Age: 80
End: 2025-03-13

## 2025-04-14 ENCOUNTER — OFFICE VISIT (OUTPATIENT)
Dept: URBAN - NONMETROPOLITAN AREA CLINIC 2 | Facility: CLINIC | Age: 80
End: 2025-04-14

## 2025-08-07 ENCOUNTER — LAB OUTSIDE AN ENCOUNTER (OUTPATIENT)
Dept: URBAN - NONMETROPOLITAN AREA CLINIC 2 | Facility: CLINIC | Age: 80
End: 2025-08-07

## 2025-08-07 ENCOUNTER — OFFICE VISIT (OUTPATIENT)
Dept: URBAN - METROPOLITAN AREA MEDICAL CENTER 1 | Facility: MEDICAL CENTER | Age: 80
End: 2025-08-07
Payer: MEDICARE

## 2025-08-07 DIAGNOSIS — K22.70 BARRETT ESOPHAGUS: ICD-10-CM

## 2025-08-07 DIAGNOSIS — R10.13 ABDOMINAL DISCOMFORT, EPIGASTRIC: ICD-10-CM

## 2025-08-07 DIAGNOSIS — K31.89 OTHER DISEASES OF STOMACH AND DUODENUM: ICD-10-CM

## 2025-08-07 PROCEDURE — 43239 EGD BIOPSY SINGLE/MULTIPLE: CPT | Performed by: INTERNAL MEDICINE

## 2025-08-07 RX ORDER — ROSUVASTATIN 5 MG/1
1 TABLET TABLET, FILM COATED ORAL ONCE A DAY
Status: ACTIVE | COMMUNITY

## 2025-08-07 RX ORDER — FLUTICASONE PROPIONATE 50 UG/1
SPRAY, METERED NASAL
Qty: 16 | Status: ACTIVE | COMMUNITY

## 2025-08-07 RX ORDER — TAMSULOSIN HYDROCHLORIDE 0.4 MG/1
1 CAPSULE CAPSULE ORAL ONCE A DAY
Status: ACTIVE | COMMUNITY

## 2025-08-07 RX ORDER — LATANOPROST 50 UG/ML
1 DROP INTO AFFECTED EYE IN THE EVENING SOLUTION/ DROPS OPHTHALMIC ONCE A DAY
Status: ACTIVE | COMMUNITY

## 2025-08-07 RX ORDER — GABAPENTIN 300 MG/1
1 CAPSULE CAPSULE ORAL ONCE A DAY
Status: ACTIVE | COMMUNITY

## 2025-08-07 RX ORDER — FAMOTIDINE 40 MG/1
1 TABLET AT BEDTIME TABLET, FILM COATED ORAL ONCE A DAY
Qty: 90 TABLET | Refills: 3 | Status: ACTIVE | COMMUNITY
Start: 2024-09-19

## 2025-08-07 RX ORDER — HYDROCHLOROTHIAZIDE 25 MG/1
TABLET ORAL
Qty: 90 | Status: ACTIVE | COMMUNITY

## 2025-08-07 RX ORDER — AMLODIPINE BESYLATE 10 MG/1
1 TABLET TABLET ORAL ONCE A DAY
Status: ACTIVE | COMMUNITY

## 2025-08-07 RX ORDER — MONTELUKAST SODIUM 10 MG/1
1 TABLET TABLET, FILM COATED ORAL ONCE A DAY
Status: ACTIVE | COMMUNITY

## 2025-08-07 RX ORDER — LOSARTAN POTASSIUM 25 MG/1
1 TABLET TABLET ORAL ONCE A DAY
Status: ACTIVE | COMMUNITY

## 2025-08-08 LAB
AP CASE REPORT: (no result)
AP FINAL DIAGNOSIS: (no result)
AP GROSS DESCRIPTION: (no result)
AP MICROSCOPIC DESCRIPTION: (no result)